# Patient Record
Sex: MALE | Race: WHITE | NOT HISPANIC OR LATINO | URBAN - NONMETROPOLITAN AREA
[De-identification: names, ages, dates, MRNs, and addresses within clinical notes are randomized per-mention and may not be internally consistent; named-entity substitution may affect disease eponyms.]

---

## 2017-01-10 RX ORDER — ATORVASTATIN CALCIUM 10 MG/1
TABLET, FILM COATED ORAL
Qty: 90 TABLET | OUTPATIENT
Start: 2017-01-10

## 2017-03-27 ENCOUNTER — TELEPHONE (OUTPATIENT)
Dept: CARDIOLOGY | Facility: CLINIC | Age: 47
End: 2017-03-27

## 2017-03-27 NOTE — TELEPHONE ENCOUNTER
"Patient called requesting refills on Atorvastatin. Advised patient to contact PCP for refills due to no labs since 2015 and no showed for the last 2 appointments. Patient stated \"ok I'm getting labs 4/3/17 per PCP\".   "

## 2017-04-10 ENCOUNTER — OFFICE VISIT (OUTPATIENT)
Dept: CARDIOLOGY | Facility: CLINIC | Age: 47
End: 2017-04-10

## 2017-04-10 VITALS
WEIGHT: 219 LBS | BODY MASS INDEX: 33.19 KG/M2 | SYSTOLIC BLOOD PRESSURE: 100 MMHG | DIASTOLIC BLOOD PRESSURE: 78 MMHG | HEIGHT: 68 IN | HEART RATE: 60 BPM

## 2017-04-10 DIAGNOSIS — E78.49 OTHER HYPERLIPIDEMIA: ICD-10-CM

## 2017-04-10 DIAGNOSIS — R06.02 SHORTNESS OF BREATH: ICD-10-CM

## 2017-04-10 DIAGNOSIS — R00.2 PALPITATIONS: ICD-10-CM

## 2017-04-10 DIAGNOSIS — I10 ESSENTIAL HYPERTENSION: Primary | ICD-10-CM

## 2017-04-10 PROBLEM — G43.909 MIGRAINE: Status: ACTIVE | Noted: 2017-04-10

## 2017-04-10 PROBLEM — E78.5 HYPERLIPEMIA: Status: ACTIVE | Noted: 2017-04-10

## 2017-04-10 PROCEDURE — 99213 OFFICE O/P EST LOW 20 MIN: CPT | Performed by: NURSE PRACTITIONER

## 2017-04-10 RX ORDER — SUMATRIPTAN 100 MG/1
100 TABLET, FILM COATED ORAL ONCE AS NEEDED
COMMUNITY

## 2017-04-10 RX ORDER — PROPRANOLOL HYDROCHLORIDE 60 MG/1
60 TABLET ORAL DAILY
Qty: 90 TABLET | Refills: 2 | Status: SHIPPED | OUTPATIENT
Start: 2017-04-10

## 2017-04-10 RX ORDER — PROPRANOLOL HYDROCHLORIDE 60 MG/1
60 TABLET ORAL DAILY
COMMUNITY
End: 2017-04-10 | Stop reason: SDUPTHER

## 2017-04-10 RX ORDER — ATORVASTATIN CALCIUM 10 MG/1
10 TABLET, FILM COATED ORAL NIGHTLY
Qty: 90 TABLET | Refills: 2 | Status: SHIPPED | OUTPATIENT
Start: 2017-04-10

## 2017-04-10 RX ORDER — SUMATRIPTAN 6 MG/.5ML
6 INJECTION, SOLUTION SUBCUTANEOUS ONCE AS NEEDED
COMMUNITY

## 2017-04-10 NOTE — PROGRESS NOTES
Chief Complaint   Patient presents with   • Follow-up     He has recent labs per PCP.    • Shortness of Breath     He states notes more at night, PCP is addressing at this time.   • Palpitations     He reports occasionally at night.   • Med Refill     Needs refills on cardiac medication-90 day.       Cardiac Complaints  dyspnea and palpitations      Subjective   Aldo Lamas is a 47 y.o. male with a history of HTN, hyperlipidemia, and strong family history of IHD.  In 2015, cardiac cath showed normal coronaries.  Labs he states with PCP and reports most recently has been having more palpitations and shortness of breath at night for which PCP is addressing. Cardiac refills requested.  Most recent labs reviewed show his most recent LDL elevated at 133 but patient had been without his lipitor for about a month.        Cardiac History  Past Surgical History:   Procedure Laterality Date   • CARDIAC CATHETERIZATION  08/13/2015    Cath- Normal Coronaries    • CARDIOVASCULAR STRESS TEST  07/29/2015    Stress- 10 min, 30 Secs. 84% THR. Negative   • ECHO - CONVERTED  07/29/2015     Echo- EF 65%    • FOOT SURGERY     • HERNIA REPAIR     • TONSILLECTOMY         Current Outpatient Prescriptions   Medication Sig Dispense Refill   • atorvastatin (LIPITOR) 10 MG tablet Take 1 tablet by mouth Every Night. 90 tablet 2   • finasteride (PROSCAR) 5 MG tablet Take 1 tablet by mouth daily. 90 tablet 3   • MELATONIN PO Take 6 mg by mouth Every Night.     • mirtazapine (REMERON) 30 MG tablet Take 30 mg by mouth every night.     • Multiple Vitamins-Minerals (MULTIVITAMIN MEN PO) Take  by mouth Daily.     • Omega-3 Fatty Acids (FISH OIL TRIPLE STRENGTH PO) Take  by mouth 2 (Two) Times a Day.     • omeprazole (PRILOSEC) 20 MG capsule Take  by mouth Daily As Needed.     • Probiotic Product (PROBIOTIC DAILY PO) Take  by mouth Daily.     • propranolol (INDERAL) 60 MG tablet Take 1 tablet by mouth Daily. 90 tablet 2   • SUMAtriptan (IMITREX) 100  "MG tablet Take 100 mg by mouth 1 (One) Time As Needed for migraine.     • SUMAtriptan (IMITREX) 6 MG/0.5ML solution injection Inject 6 mg under the skin 1 (One) Time As Needed for migraine.     • tamsulosin (FLOMAX) 0.4 MG capsule 24 hr capsule Take 1 capsule by mouth daily. 90 capsule 3     No current facility-administered medications for this visit.        Review of patient's allergies indicates no known allergies.    Past Medical History:   Diagnosis Date   • Diverticulitis    • H/O inguinal hernia repair     Bilateral inguinal hernia repair childhood   • Hx of tonsillectomy    • Hypercholesterolemia    • Hypertension    • Migraine    • Migraines    • Prostate problems        Social History     Social History   • Marital status:      Spouse name: N/A   • Number of children: N/A   • Years of education: N/A     Occupational History   • Not on file.     Social History Main Topics   • Smoking status: Never Smoker   • Smokeless tobacco: Never Used   • Alcohol use No   • Drug use: No   • Sexual activity: Not on file     Other Topics Concern   • Not on file     Social History Narrative       Family History   Problem Relation Age of Onset   • Heart disease Father    • Stroke Father    • Skin cancer Mother    • Other Mother      Tachycardia       Review of Systems   Constitutional: Negative.    Respiratory: Positive for shortness of breath.    Cardiovascular: Positive for palpitations.   Gastrointestinal: Negative.    Endocrine: Negative.    Musculoskeletal: Negative.    Skin: Negative.    Neurological: Negative.    Psychiatric/Behavioral: Negative.        DiabetesNo  Thyroidnormal    Objective     /78 (BP Location: Left arm)  Pulse 60  Ht 68\" (172.7 cm)  Wt 219 lb (99.3 kg)  BMI 33.3 kg/m2    Physical Exam   Constitutional: He is oriented to person, place, and time. He appears well-developed and well-nourished.   HENT:   Head: Normocephalic and atraumatic.   Eyes: EOM are normal. Pupils are equal, round, " and reactive to light.   Neck: Normal range of motion. Neck supple.   Cardiovascular: Normal rate and regular rhythm.    Murmur heard.  Pulmonary/Chest: Effort normal and breath sounds normal.   Abdominal: Soft.   Musculoskeletal: Normal range of motion.   Neurological: He is alert and oriented to person, place, and time.   Skin: Skin is warm and dry.   Psychiatric: He has a normal mood and affect.       Procedures    Assessment/Plan     HR and BP are stable today.  He continues to take propanolol for palpitations and has tolerated well without the atenolol.  He has lost about 15 pounds since last appointment, he is watching his food intake and is getting into an exercise regimen more.  He states you are currently evaluating his issues with not being able to sleep, he states he will have a sleep study soon.  Refills of lipitor sent as well as inderal.  He states you are following his labs.  Could we get next copy?  No new testing advised today as no new concerns voiced.  6 month follow up advised or sooner if needed.      Problems Addressed this Visit        Cardiovascular and Mediastinum    HTN (hypertension) - Primary    Relevant Medications    propranolol (INDERAL) 60 MG tablet    Hyperlipemia    Relevant Medications    atorvastatin (LIPITOR) 10 MG tablet      Other Visit Diagnoses     Shortness of breath        Palpitations                      Electronically signed by AXEL Copeland April 10, 2017 5:00 PM

## 2017-08-06 DIAGNOSIS — N40.0 BENIGN NON-NODULAR PROSTATIC HYPERPLASIA WITHOUT LOWER URINARY TRACT SYMPTOMS: ICD-10-CM

## 2017-08-07 RX ORDER — FINASTERIDE 5 MG/1
TABLET, FILM COATED ORAL
Qty: 90 TABLET | Refills: 2 | Status: SHIPPED | OUTPATIENT
Start: 2017-08-07

## 2017-08-08 DIAGNOSIS — N40.0 BENIGN NON-NODULAR PROSTATIC HYPERPLASIA WITHOUT LOWER URINARY TRACT SYMPTOMS: ICD-10-CM

## 2017-08-10 RX ORDER — TAMSULOSIN HYDROCHLORIDE 0.4 MG/1
CAPSULE ORAL
Qty: 90 CAPSULE | Refills: 2 | Status: SHIPPED | OUTPATIENT
Start: 2017-08-10